# Patient Record
Sex: MALE | Race: WHITE | NOT HISPANIC OR LATINO | Employment: OTHER | URBAN - NONMETROPOLITAN AREA
[De-identification: names, ages, dates, MRNs, and addresses within clinical notes are randomized per-mention and may not be internally consistent; named-entity substitution may affect disease eponyms.]

---

## 2024-07-03 ENCOUNTER — OFFICE VISIT (OUTPATIENT)
Dept: URGENT CARE | Facility: CLINIC | Age: 69
End: 2024-07-03
Payer: MEDICARE

## 2024-07-03 VITALS
HEART RATE: 74 BPM | SYSTOLIC BLOOD PRESSURE: 163 MMHG | RESPIRATION RATE: 20 BRPM | TEMPERATURE: 98 F | WEIGHT: 171.2 LBS | OXYGEN SATURATION: 97 % | DIASTOLIC BLOOD PRESSURE: 94 MMHG

## 2024-07-03 DIAGNOSIS — H66.003 NON-RECURRENT ACUTE SUPPURATIVE OTITIS MEDIA OF BOTH EARS WITHOUT SPONTANEOUS RUPTURE OF TYMPANIC MEMBRANES: Primary | ICD-10-CM

## 2024-07-03 PROCEDURE — 99203 OFFICE O/P NEW LOW 30 MIN: CPT

## 2024-07-03 PROCEDURE — G0463 HOSPITAL OUTPT CLINIC VISIT: HCPCS

## 2024-07-03 RX ORDER — FLUTICASONE PROPIONATE 93 UG/1
SPRAY, METERED NASAL
COMMUNITY

## 2024-07-03 RX ORDER — AMOXICILLIN 500 MG/1
1000 CAPSULE ORAL EVERY 12 HOURS SCHEDULED
Qty: 28 CAPSULE | Refills: 0 | Status: SHIPPED | OUTPATIENT
Start: 2024-07-03 | End: 2024-07-10

## 2024-07-03 NOTE — PROGRESS NOTES
[facility]        NAME: Baltazar Isaac is a 69 y.o. male  : 1955    MRN: [id number]  DATE: July 3, 2024  TIME: 10:59 AM    Assessment and Plan   Non-recurrent acute suppurative otitis media of both ears without spontaneous rupture of tympanic membranes [H66.003]  1. Non-recurrent acute suppurative otitis media of both ears without spontaneous rupture of tympanic membranes  amoxicillin (AMOXIL) 500 mg capsule        Amoxicillin for bilateral ear infection.     Patient Instructions     You have been prescribed an antibiotic.  Take antibiotic as directed for the full duration.  Do not stop the antibiotics just because you are feeling better.  Side effects of antibiotics include diarrhea.  Eat yogurt or take a probiotic or acidophilus tablet while taking this medication to help prevent diarrhea and replenish good gut bacteria.  Follow up with PCP in 3-5 days.  Proceed to  ER if symptoms worsen.    Chief Complaint     Chief Complaint   Patient presents with    Earache     Started Monday night both ears.  Using Sudafed and Advil         History of Present Illness       Patient is a 69 year old male who presents to the office today for bilateral ear pain since last night. He notes last week he had a sinus cold and then Monday night had right ear pain which resolved. Did take sudafed yesterday. Denies ottorhea. Always has tinnitus and admits to some hearing loss. Denies fever.     Earache   Associated symptoms include hearing loss. Pertinent negatives include no coughing, rhinorrhea or sore throat.       Review of Systems   Review of Systems   HENT:  Positive for ear pain, hearing loss and tinnitus. Negative for drooling, rhinorrhea, sinus pressure, sinus pain, sneezing and sore throat.    Respiratory:  Negative for cough.    All other systems reviewed and are negative.        Current Medications       Current Outpatient Medications:     amoxicillin (AMOXIL) 500 mg capsule, Take 2 capsules (1,000 mg  total) by mouth every 12 (twelve) hours for 7 days, Disp: 28 capsule, Rfl: 0    Fluticasone Propionate (Xhance) 93 MCG/ACT EXHU, 0, Disp: , Rfl:     Current Allergies     Allergies as of 07/03/2024    (No Known Allergies)            The following portions of the patient's history were reviewed and updated as appropriate: allergies, current medications, past family history, past medical history, past social history, past surgical history and problem list.     History reviewed. No pertinent past medical history.    History reviewed. No pertinent surgical history.    History reviewed. No pertinent family history.      Medications have been verified.        Objective   /94   Pulse 74   Temp 98 °F (36.7 °C)   Resp 20   Wt 77.7 kg (171 lb 3.2 oz)   SpO2 97%        Physical Exam     Physical Exam  Vitals and nursing note reviewed.   Constitutional:       General: He is not in acute distress.     Appearance: Normal appearance. He is normal weight. He is not ill-appearing or toxic-appearing.   HENT:      Right Ear: A middle ear effusion is present. Tympanic membrane is erythematous and bulging.      Left Ear: A middle ear effusion is present. Tympanic membrane is erythematous and bulging.      Nose: Nose normal.      Mouth/Throat:      Mouth: Mucous membranes are moist.   Cardiovascular:      Rate and Rhythm: Normal rate and regular rhythm.      Pulses: Normal pulses.      Heart sounds: Normal heart sounds.   Pulmonary:      Effort: Pulmonary effort is normal.      Breath sounds: Normal breath sounds.   Skin:     General: Skin is warm.      Capillary Refill: Capillary refill takes less than 2 seconds.   Neurological:      General: No focal deficit present.      Mental Status: He is alert.